# Patient Record
(demographics unavailable — no encounter records)

---

## 2024-11-12 NOTE — ASSESSMENT
[FreeTextEntry1] : Assessment:   Ernesto is a 66 y/o M with history and physical exam consistent with severe right hip OA.    Plan:   - Lengthy discussion regarding options was had with the patient. Nonsurgical options including but not limited to cortisone, Prescription anti-inflammatory medications (both steroidal and non-steroidal), activity modification, non-impact exercise, maintaining a healthy BMI, and icing were reviewed.   - Discussed Home exercise Program as well as Rest, Ice and elevation.   - At this time after discussion of the options, the patient would benefit from organized Physical Therapy to increase overall functionality.   - The patient was provided with an Rx in office today and was instructed to attend PT for 6-8 weeks in order to increase strength and ROM. Patient agreed with this plan and will begin PT as soon as they can.   - After discussion of options, Patient was provided with a prescription for Meloxicam 15mg Daily.    - Time was taken to discuss the importance of proper prescription drug management. They were instructed to take this daily with food for two weeks and then intermittently as needed. The patient was also warned of potential risks/side effects of the medication. These potential risks include bruising, gastrointestinal bleed, gastrointestinal burning, allergic reaction and reduced blood clotting. The patient expressed verbal understanding. I recommend that the patient follow-up with their medical physician to discuss any significant specific potential issues with long term use such as interactions with current medications or with exacerbation of underlying medical morbidities.   - Follow up in 6 weeks for re-evaluation - will consider CSI injection versus discussion regarding total hip arthroplasty depending on patient response   - The Patient was asked if they had any remaining questions about today's visit and the diagnosis, and all questions were answered. Patient understands the plan going forward.

## 2024-11-12 NOTE — HISTORY OF PRESENT ILLNESS
[de-identified] : 11/12/2024 Subjective: Ernesto is a 66 y/o M with PMHx of psoriasis who presents for an evaluation of his right hip pain. Patient notes intermittent right hip pain for years but over the past 3 months the pain has worsened. Patient states that there was an episode where he turned the RLE, which exacerbated his pain. Patient states that following that activity, walking became difficult. Patient localized the pain to the groin. He notes episodes of buckling due to the right hip pain. He also notes episodes of pain at night where it can wake him up. Pain exacerbated by getting in and out of a car. When he bends down to work on the bathroom floor, he notes pain. Patient is aware what sciatica pain feels like but states that this feels different. No issues with taking on/off shoes or socks. Patient takes motrin with temporary relief. Patient works as a  at construction company.   Hx of BPH and looking to get that taken care of in about 4-6 weeks. Patient denies hx of prostate cancer.    Patient presents today, 67 year M, for evaluation of their RT hip Date of Injury/Onset: Worse within the Last month and a half.  Mechanism of injury: NKI. Has been having on and off pain. Has been worse and more consistent lately.  This is not a Work-Related Injury being treated under Worker's Compensation. This is not an athletic injury occurring associated with an interscholastic or organized sports team.   Pain: At Rest: 3/10 With Activity: 10/10 Quality of symptoms: sharp, sudden stabbing. Feels unstable, like his leg is going to "drop off". States it feels different from sciatica pain.    Affecting Sleep: Yes Stiffness upon waking, lasting greater than 30mins: none Difficulty with stairs: Yes Difficulty getting in and out of car: Yes Difficulty applying shoe: Yes Sit to stand stiffness: Yes Affects walking short/long distances? Yes Home/Work/Recreation affected? Yes    Improves with: motrin with a little relief  Worse with: twisting/ turning Have you been treated for this in the past? No Have you had surgery for this in the past? [No   OTC Medicines: motrin  RX medicines: none Heat, Ice, Elevation: heat with little to no relief.    CSI or Gel Injections: None Physical Therapy/ HEP: None   Previous Treatment/Imaging/Studies Since Last Visit: none

## 2024-11-12 NOTE — IMAGING
[de-identified] : LEFT Lower Extremity   Inspection no gross deformity, skin intact, no erythema Palpation; patient with - tenderness to palpation in the groin, - tenderness over the greater troches ROM: 105 flexion, 10 IR, 30 ER, 45 Abduction, 15 Adduction - Stinchfield - FADIR, - JEREMIAH - SLR  5/5 motor to lower extremity Sensation intact to light touch throughout all lower extremity dermatomes No numbness in lateral femoral cutaneous nerve 2+ distal pulses    RIGHT Lower Extremity   Inspection no gross deformity, skin intact, no erythema Palpation; patient with - tenderness to palpation in the groin, - tenderness over the greater troches ROM: 105 flexion with pain, 5 IR, 30 ER, 45 Abduction with pain, 15 Adduction ++ Stinchfield ++ FADIR, - JEREMIAH - SLR  5/5 motor to lower extremity Sensation intact to light touch throughout all lower extremity dermatomes No numbness in lateral femoral cutaneous nerve 2+ distal pulses  Right leg 2-3mm shorter than left leg. Leg length discrepancy assessed at the medial mal   X-rays of the RIGHT hip with pelvis is as follows: Hip with pelvis 2 - 3 view in the views: Joint space narrowing >75% with osteophyte formation, sclerosis and CAM lesion consistent with severe DJD of the right hip. femoral head cyst present, inferior osteophytes and medial narrowing. No acute fractures noted. moderate L hip OA

## 2024-12-31 NOTE — IMAGING
[de-identified] : Physical exam unchanged from previous examination.  LEFT Lower Extremity   Inspection no gross deformity, skin intact, no erythema Palpation; patient with - tenderness to palpation in the groin, - tenderness over the greater troches ROM: 105 flexion, 10 IR, 30 ER, 45 Abduction, 15 Adduction - Stinchfield - FADIR, - JEREMIAH - SLR  5/5 motor to lower extremity Sensation intact to light touch throughout all lower extremity dermatomes No numbness in lateral femoral cutaneous nerve 2+ distal pulses  RIGHT Lower Extremity   Inspection no gross deformity, skin intact, no erythema Palpation; patient with - tenderness to palpation in the groin, - tenderness over the greater troches ROM: 105 flexion with pain, 5 IR, 30 ER, 45 Abduction with pain, 15 Adduction ++ Stinchfield ++ FADIR, - JEREMIAH - SLR  5/5 motor to lower extremity Sensation intact to light touch throughout all lower extremity dermatomes No numbness in lateral femoral cutaneous nerve 2+ distal pulses  Right leg 2-3mm shorter than left leg. Leg length discrepancy assessed at the medial mal   11/12: X-rays of the RIGHT hip with pelvis is as follows: Hip with pelvis 2 - 3 view in the views: Joint space narrowing >75% with osteophyte formation, sclerosis and CAM lesion consistent with severe DJD of the right hip. femoral head cyst present, inferior osteophytes and medial narrowing. No acute fractures noted. moderate L hip OA

## 2024-12-31 NOTE — HISTORY OF PRESENT ILLNESS
[de-identified] : 12/31/2024 ERNESTO CARPENTER is a 67-year y/o M who presents for a f/u evaluation of right hip pain. Patient was prescribed PT and meloxicam at his last visit. Patient has had mild improvement with twice weekly PT for the last 5 weeks, and daily meloxicam but states the improvement is temporary. Patient reports he continues to have pain despite the meloxicam or physical therapy. Patient notes his pain in the AM is manageable, but his pain worsens throughout the day. Patient does a lot of driving and on his feet all day as a  and states his right hip pain is impacting his ability to do so.   Patient lives with his wife. Patient does have stairs up to his bedroom but states that for the first couple of weeks, he can sleep on the first floor to avoid the stairs.   Patient hx of Psoriasis, with last dose of Tultz today. Patietn denies smoking hx. Patient denies any cardiac, liver or renal issues. Patient denies PMHx of DM.   11/12/2024 Subjective: Ernesto is a 68 y/o M with PMHx of psoriasis who presents for an evaluation of his right hip pain. Patient notes intermittent right hip pain for years but over the past 3 months the pain has worsened. Patient states that there was an episode where he turned the RLE, which exacerbated his pain. Patient states that following that activity, walking became difficult. Patient localized the pain to the groin. He notes episodes of buckling due to the right hip pain. He also notes episodes of pain at night where it can wake him up. Pain exacerbated by getting in and out of a car. When he bends down to work on the bathroom floor, he notes pain. Patient is aware what sciatica pain feels like but states that this feels different. No issues with taking on/off shoes or socks. Patient takes motrin with temporary relief. Patient works as a  at construction company.   Hx of BPH and looking to get that taken care of in about 4-6 weeks. Patient denies hx of prostate cancer.    Patient presents today, 67 year M, for evaluation of their RT hip Date of Injury/Onset: Worse within the Last month and a half.  Mechanism of injury: NKI. Has been having on and off pain. Has been worse and more consistent lately.  This is not a Work-Related Injury being treated under Worker's Compensation. This is not an athletic injury occurring associated with an interscholastic or organized sports team.   Pain: At Rest: 3/10 With Activity: 10/10 Quality of symptoms: sharp, sudden stabbing. Feels unstable, like his leg is going to "drop off". States it feels different from sciatica pain.    Affecting Sleep: Yes Stiffness upon waking, lasting greater than 30mins: none Difficulty with stairs: Yes Difficulty getting in and out of car: Yes Difficulty applying shoe: Yes Sit to stand stiffness: Yes Affects walking short/long distances? Yes Home/Work/Recreation affected? Yes    Improves with: motrin with a little relief  Worse with: twisting/ turning Have you been treated for this in the past? No Have you had surgery for this in the past? [No   OTC Medicines: motrin  RX medicines: none Heat, Ice, Elevation: heat with little to no relief.    CSI or Gel Injections: None Physical Therapy/ HEP: None   Previous Treatment/Imaging/Studies Since Last Visit: none

## 2024-12-31 NOTE — ASSESSMENT
[FreeTextEntry1] : Assessment:   GITA CARPENTER is a 67 year year y/o M with history and physical exam consistent with SEVERE END STAGE BONE ON BONE right hip osteoarthritis. Patient has failed nonoperative management including anti-inflammatory medication, PT and activity modification. Patient's right hip pain continues to negatively impact the patient's quality of life and interfere with his activities of daily living including walking short distances, prolonged standing and driving.  Plan:  - Lengthy discussion regarding options was had with the patient. Nonsurgical options including but not limited to cortisone, Prescription anti-inflammatory medications (both steroidal and non-steroidal), activity modification, non-impact exercise, maintaining a healthy BMI, and icing were reviewed. Patient has failed nonoperative management.    - Discussed Home exercise Program as well as Rest, Ice and elevation.   - Recommended patient continue with the meloxicam as prescribed as needed for pain.    - Time was taken to discuss the importance of proper prescription drug management. They were instructed to take this daily with food for two weeks and then intermittently as needed. The patient was also warned of potential risks/side effects of the medication. These potential risks include bruising, gastrointestinal bleed, gastrointestinal burning, allergic reaction and reduced blood clotting. The patient expressed verbal understanding. I recommend that the patient follow-up with their medical physician to discuss any significant specific potential issues with long term use such as interactions with current medications or with exacerbation of underlying medical morbidities.  - Offered intra-articular CSI in the right hip. We discussed that a CSI would delay any surgery by at least 3 months due to risks of infection. The patient deferred as he is looking for definitive tx at this time.   - Patient has progressively severe RIGHT hip pain and disability secondary to DJD and has failed extensive conservative care including activity modification, analgesic medications and therapeutic exercise.  The patient was indicated for RIGHT total hip replacement Procedure:  We discussed the details of the total hip procedure, the expected recovery period, and the expected outcome.  For this patient we will plan a DIRECT ANTERIOR approach with cementless acetabular fixation and femoral fixation determined by bone quality. The surgical approach may require to be changed if any superficial infections arise prior to surgery and the patient understands this.    Recovery: We discussed the expected time course for return of function and relief of pain.  We discussed how compliance with postoperative instructions facilitates the expected recovery and minimizes risk of complications.   Outcomes: We discussed expected surgical outcomes, the likelihood of satisfaction after complete recovery, and the potential causes of dissatisfaction.    Risks: Specific risks of total hip replacement were discussed in detail. We discussed the risk of surgical site complications including but not limited to: surgical site infection, wound complications, bone fracture, prosthetic hip dislocation, nerve injury, vascular injury, hemorrhage, limb length inequality, fixation failure, persistent pain and need for reoperation.  The risk of anterolateral thigh numbness with an Anterior approach was discussed.  We discussed the risk of medical complications including but not limited to venous thromboembolism and other cardiopulmonary complications. The relationship between the patient's comorbid medical conditions and potential complications were discussed.   Anesthesia: We discussed anesthesia and the risk of anesthesia-related complications.    Durability: We discussed the durability of prosthetic hips and limitations related to wear, osteolysis and loosening.   The patient gave informed consent for the surgical procedure and was instructed to speak to my surgical coordinator to arrange the logistics of surgical scheduling, presurgical testing, and medical optimization and clearance.  - Discussed f/u with dermatologist to discuss psoriasis treatment before and after the surgery as some of the immunologic medication the patient is on may need to be stopped in the perioperative period.     - F/u for surgery or sooner, if changes or if further questions of concerns arise.    - The Patient was asked if they had any remaining questions about today's visit and the diagnosis, and all questions were answered. Patient understands the plan going forward.

## 2025-06-19 NOTE — HISTORY OF PRESENT ILLNESS
[de-identified] : 06/19/2025: ERNESTO CARPENTER is a 67 year y/o M who presents for a f/u evaluation of right hip pain. Patient here today to discuss surgery. Patient was scheduled for hip replacement in March but was cancelled due to needing prostate surgery. Patient presents s/p prostate surgery on April 24th 2025.  Patient no longer takes the cyclosporine for psoriasis. Patient now takes Cosentyx for psoriasis.   12/31/2024 ERNESTO CARPENTER is a 67-year y/o M who presents for a f/u evaluation of right hip pain. Patient was prescribed PT and meloxicam at his last visit. Patient has had mild improvement with twice weekly PT for the last 5 weeks, and daily meloxicam but states the improvement is temporary. Patient reports he continues to have pain despite the meloxicam or physical therapy. Patient notes his pain in the AM is manageable, but his pain worsens throughout the day. Patient does a lot of driving and on his feet all day as a  and states his right hip pain is impacting his ability to do so.   Patient lives with his wife. Patient does have stairs up to his bedroom but states that for the first couple of weeks, he can sleep on the first floor to avoid the stairs.   Patient hx of Psoriasis, with last dose of Tultz today. Melvietn denies smoking hx. Patient denies any cardiac, liver or renal issues. Patient denies PMHx of DM.   11/12/2024 Subjective: Ernesto is a 66 y/o M with PMHx of psoriasis who presents for an evaluation of his right hip pain. Patient notes intermittent right hip pain for years but over the past 3 months the pain has worsened. Patient states that there was an episode where he turned the RLE, which exacerbated his pain. Patient states that following that activity, walking became difficult. Patient localized the pain to the groin. He notes episodes of buckling due to the right hip pain. He also notes episodes of pain at night where it can wake him up. Pain exacerbated by getting in and out of a car. When he bends down to work on the bathroom floor, he notes pain. Patient is aware what sciatica pain feels like but states that this feels different. No issues with taking on/off shoes or socks. Patient takes motrin with temporary relief. Patient works as a  at construction company.   Hx of BPH and looking to get that taken care of in about 4-6 weeks. Patient denies hx of prostate cancer.    Patient presents today, 67 year M, for evaluation of their RT hip Date of Injury/Onset: Worse within the Last month and a half.  Mechanism of injury: NKI. Has been having on and off pain. Has been worse and more consistent lately.  This is not a Work-Related Injury being treated under Worker's Compensation. This is not an athletic injury occurring associated with an interscholastic or organized sports team.   Pain: At Rest: 3/10 With Activity: 10/10 Quality of symptoms: sharp, sudden stabbing. Feels unstable, like his leg is going to "drop off". States it feels different from sciatica pain.    Affecting Sleep: Yes Stiffness upon waking, lasting greater than 30mins: none Difficulty with stairs: Yes Difficulty getting in and out of car: Yes Difficulty applying shoe: Yes Sit to stand stiffness: Yes Affects walking short/long distances? Yes Home/Work/Recreation affected? Yes    Improves with: motrin with a little relief  Worse with: twisting/ turning Have you been treated for this in the past? No Have you had surgery for this in the past? [No   OTC Medicines: motrin  RX medicines: none Heat, Ice, Elevation: heat with little to no relief.    CSI or Gel Injections: None Physical Therapy/ HEP: None   Previous Treatment/Imaging/Studies Since Last Visit: none

## 2025-06-19 NOTE — IMAGING
[de-identified] : Physical exam unchanged from previous examination.  LEFT Lower Extremity   Inspection no gross deformity, skin intact, no erythema Palpation; patient with - tenderness to palpation in the groin, - tenderness over the greater troches ROM: 105 flexion, 10 IR, 30 ER, 45 Abduction, 15 Adduction - Stinchfield - FADIR, - JEREMIAH - SLR  5/5 motor to lower extremity Sensation intact to light touch throughout all lower extremity dermatomes No numbness in lateral femoral cutaneous nerve 2+ distal pulses  RIGHT Lower Extremity   Inspection no gross deformity, skin intact, no erythema Palpation; patient with - tenderness to palpation in the groin, - tenderness over the greater troches ROM: 105 flexion with pain, 5 IR, 30 ER, 45 Abduction with pain, 15 Adduction ++ Stinchfield ++ FADIR, - JEREMIAH - SLR  5/5 motor to lower extremity Sensation intact to light touch throughout all lower extremity dermatomes No numbness in lateral femoral cutaneous nerve 2+ distal pulses  Right leg 2-3mm shorter than left leg. Leg length discrepancy assessed at the medial mal   11/12: X-rays of the RIGHT hip with pelvis is as follows: Hip with pelvis 2 - 3 view in the views: Joint space narrowing >75% with osteophyte formation, sclerosis and CAM lesion consistent with severe DJD of the right hip. femoral head cyst present, inferior osteophytes and medial narrowing. No acute fractures noted. moderate L hip OA